# Patient Record
Sex: MALE | Race: WHITE | Employment: UNEMPLOYED | URBAN - METROPOLITAN AREA
[De-identification: names, ages, dates, MRNs, and addresses within clinical notes are randomized per-mention and may not be internally consistent; named-entity substitution may affect disease eponyms.]

---

## 2019-10-28 ENCOUNTER — HOSPITAL ENCOUNTER (OUTPATIENT)
Age: 49
Discharge: HOME | End: 2019-10-28
Payer: COMMERCIAL

## 2019-10-28 ENCOUNTER — HOSPITAL ENCOUNTER (OUTPATIENT)
Age: 49
Discharge: STILL ADMITTED OR ADMINISTRATIVE | End: 2019-10-28
Payer: COMMERCIAL

## 2019-10-28 VITALS
OXYGEN SATURATION: 98 % | TEMPERATURE: 99 F | HEIGHT: 71 IN | RESPIRATION RATE: 20 BRPM | BODY MASS INDEX: 33.88 KG/M2 | HEART RATE: 76 BPM | DIASTOLIC BLOOD PRESSURE: 80 MMHG | WEIGHT: 242 LBS | SYSTOLIC BLOOD PRESSURE: 143 MMHG

## 2019-10-28 DIAGNOSIS — S61.011A LACERATION OF RIGHT THUMB WITHOUT FOREIGN BODY WITHOUT DAMAGE TO NAIL, INITIAL ENCOUNTER: Primary | ICD-10-CM

## 2019-10-28 PROCEDURE — 12001 RPR S/N/AX/GEN/TRNK 2.5CM/<: CPT

## 2019-10-28 PROCEDURE — 99202 OFFICE O/P NEW SF 15 MIN: CPT

## 2019-10-28 PROCEDURE — 99203 OFFICE O/P NEW LOW 30 MIN: CPT

## 2019-10-28 NOTE — ED PROVIDER NOTES
Patient presents with:  Laceration Abrasion (integumentary)      HPI:     Ivana James is a 54year old male with no significant past medical history presents with a chief complaint of right thumb laceration.   Patient states he was installing out above his 1. Laceration of right thumb without foreign body without damage to nail, initial encounter S61.011A        All results reviewed and discussed with patient. See AVS for detailed discharge instructions for your condition today.     [facility]

## 2019-10-28 NOTE — ED INITIAL ASSESSMENT (HPI)
Patient with right thumb laceration. States he was installing a pereyra above his stove when he cut his right thumb on the sheet metal.  Patient states his tdap was updated within the last 10 years.

## 2019-10-29 RX ORDER — CEPHALEXIN 500 MG/1
500 CAPSULE ORAL 2 TIMES DAILY
Qty: 14 CAPSULE | Refills: 0 | Status: SHIPPED | OUTPATIENT
Start: 2019-10-29 | End: 2019-11-05

## 2019-10-29 NOTE — PROGRESS NOTES
Pt called and stated that he forgot to mention that he has had multiple infections post laceration. He states the wound looks very good today. No drainage, erythema or warmth.  He is requesting Keflex which I will call in but told him to hold the antibiotic

## 2019-11-04 ENCOUNTER — WALK IN (OUTPATIENT)
Dept: URGENT CARE | Age: 49
End: 2019-11-04

## 2019-11-04 VITALS — HEART RATE: 68 BPM | RESPIRATION RATE: 18 BRPM | TEMPERATURE: 97.8 F

## 2019-11-04 DIAGNOSIS — Z48.02 VISIT FOR SUTURE REMOVAL: Primary | ICD-10-CM

## 2019-11-04 PROCEDURE — 99202 OFFICE O/P NEW SF 15 MIN: CPT | Performed by: NURSE PRACTITIONER

## 2019-11-04 RX ORDER — BUSPIRONE HYDROCHLORIDE 15 MG/1
TABLET ORAL
Refills: 2 | COMMUNITY
Start: 2019-10-01

## 2019-11-04 ASSESSMENT — ENCOUNTER SYMPTOMS
RESPIRATORY NEGATIVE: 1
NEUROLOGICAL NEGATIVE: 1
CONSTITUTIONAL NEGATIVE: 1
WOUND: 1

## 2019-11-22 ENCOUNTER — LAB ENCOUNTER (OUTPATIENT)
Dept: LAB | Age: 49
End: 2019-11-22
Attending: FAMILY MEDICINE
Payer: COMMERCIAL

## 2019-11-22 DIAGNOSIS — K92.1 BLOOD IN THE STOOL: Primary | ICD-10-CM

## 2019-11-22 PROCEDURE — 36415 COLL VENOUS BLD VENIPUNCTURE: CPT

## 2019-11-22 PROCEDURE — 85025 COMPLETE CBC W/AUTO DIFF WBC: CPT

## 2021-01-04 ENCOUNTER — HOSPITAL ENCOUNTER (OUTPATIENT)
Age: 51
Discharge: HOME | End: 2021-01-04
Payer: COMMERCIAL

## 2021-01-04 ENCOUNTER — APPOINTMENT (OUTPATIENT)
Dept: GENERAL RADIOLOGY | Age: 51
End: 2021-01-04
Attending: PHYSICIAN ASSISTANT
Payer: COMMERCIAL

## 2021-01-04 VITALS
OXYGEN SATURATION: 97 % | HEIGHT: 71 IN | HEART RATE: 87 BPM | RESPIRATION RATE: 18 BRPM | WEIGHT: 240 LBS | DIASTOLIC BLOOD PRESSURE: 90 MMHG | SYSTOLIC BLOOD PRESSURE: 144 MMHG | BODY MASS INDEX: 33.6 KG/M2 | TEMPERATURE: 98 F

## 2021-01-04 DIAGNOSIS — S82.891A CLOSED FRACTURE OF RIGHT ANKLE, INITIAL ENCOUNTER: Primary | ICD-10-CM

## 2021-01-04 PROCEDURE — 73630 X-RAY EXAM OF FOOT: CPT | Performed by: PHYSICIAN ASSISTANT

## 2021-01-04 PROCEDURE — 99213 OFFICE O/P EST LOW 20 MIN: CPT

## 2021-01-04 PROCEDURE — 73610 X-RAY EXAM OF ANKLE: CPT | Performed by: PHYSICIAN ASSISTANT

## 2021-01-04 PROCEDURE — 99214 OFFICE O/P EST MOD 30 MIN: CPT

## 2021-01-04 PROCEDURE — 29515 APPLICATION SHORT LEG SPLINT: CPT

## 2021-01-04 RX ORDER — HYDROCODONE BITARTRATE AND ACETAMINOPHEN 5; 325 MG/1; MG/1
1-2 TABLET ORAL EVERY 6 HOURS PRN
Qty: 12 TABLET | Refills: 0 | Status: SHIPPED | OUTPATIENT
Start: 2021-01-04 | End: 2021-01-11

## 2021-01-04 RX ORDER — BUSPIRONE HYDROCHLORIDE 15 MG/1
TABLET ORAL
COMMUNITY
Start: 2019-10-01

## 2021-01-04 NOTE — ED PROVIDER NOTES
Patient Seen in: [facility]      History   Patient presents with:  Leg or Foot Injury    Stated Complaint: Right ankle injury, slipped on the ice, swollen, red, painful    HPI/Subjective:   HPI    53 yo male here for evaluation of R lower ext Abdominal:      General: Abdomen is flat. Musculoskeletal: Normal range of motion. Right ankle: He exhibits swelling and ecchymosis. Tenderness. Medial malleolus tenderness found.       Comments: 2+ dp pulse, brisk cap refill       Skin:     Genera

## 2024-03-19 PROBLEM — Z00.00 ENCOUNTER FOR WELLNESS EXAMINATION IN ADULT: Status: ACTIVE | Noted: 2024-03-19

## 2024-03-19 NOTE — PROGRESS NOTES
Subjective:   Jaret Arana is a 53 year old male who presents for Routine Physical (Lab orders)   Patient is a pleasant 53-year-old male with past medical history consistent for anxiety.  Patient presents to office today for physical and lab work.  Patient states his health pretty good. Hx of right ankle fracture, has stiffness at times.     Diet: Not the greatest, feels its getting better, too many carbs, wants more vegetables. 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   Exercise: workouts with  when not on road. 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 Does lots of weight training. Tries to go early and do treadmill.   Sleep: Sleep better since pandemic. Has hx of DREAD, has machine he does not use. He has lost weight and sleeping has become better.   Stress: Years ago stressed with [employer]. Now with goo goo dolls less stress. Would decompress to manage stress at home.   Social: Lives alone. Dating. Own a home in [place].   Sexually Active: No and yes soon. New relationship.   Prophylaxis: Condoms.  Alcohol: Drink beer on occasion, over it.   Tobacco: No never.   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   Vaccinations: shingles today   Colon: Colonoscopy 2022 good for 10 years.     Does take take testosterone from [employer]. Reports 300 baseline. 150 mg cypionate intermittently. Last injected today.   Denies use of estrogen blocker.  Reports intermittent mood swings.  Does not have issues with acne. Reports last level on TRT 1000.            History reviewed. No pertinent past medical history.   History reviewed. No pertinent surgical history.     History/Other:    Chief Complaint Reviewed and Verified  Nursing Notes Reviewed and   Verified  Tobacco Reviewed  Allergies Reviewed  Medications Reviewed    Problem List Reviewed  Medical History Reviewed  Surgical History   Reviewed  Family History Reviewed  Social History Reviewed         Tobacco:  He has never smoked  tobacco.    No current outpatient medications on file.         Review of Systems:  Review of Systems   Constitutional: Negative.  Negative for activity change, chills and fever.   HENT: Negative.  Negative for congestion, ear pain, postnasal drip, sinus pain, sore throat and trouble swallowing.    Respiratory: Negative.  Negative for cough, shortness of breath and wheezing.    Cardiovascular: Negative.  Negative for chest pain and leg swelling.   Gastrointestinal: Negative.  Negative for abdominal pain, blood in stool, constipation and diarrhea.   Endocrine: Negative.    Genitourinary: Negative.  Negative for difficulty urinating, dysuria and flank pain.        Low libido   Musculoskeletal: Negative.  Negative for arthralgias, back pain and neck stiffness.   Skin: Negative.  Negative for color change and rash.   Neurological: Negative.  Negative for dizziness and headaches.   Hematological:  Negative for adenopathy.         Objective:   /84 (BP Location: Right arm, Patient Position: Sitting, Cuff Size: adult)   Pulse 63   Ht 5' 11\" (1.803 m)   Wt 228 lb (103.4 kg)   BMI 31.80 kg/m²  Estimated body mass index is 31.8 kg/m² as calculated from the following:    Height as of this encounter: 5' 11\" (1.803 m).    Weight as of this encounter: 228 lb (103.4 kg).  Physical Exam  Vitals and nursing note reviewed.   Constitutional:       Appearance: Normal appearance. He is normal weight.   HENT:      Head: Normocephalic.      Right Ear: Tympanic membrane, ear canal and external ear normal.      Left Ear: Tympanic membrane, ear canal and external ear normal.      Nose: Nose normal.      Comments: Narrow oropharynx     Mouth/Throat:      Mouth: Mucous membranes are moist.   Eyes:      Extraocular Movements: Extraocular movements intact.      Pupils: Pupils are equal, round, and reactive to light.   Cardiovascular:      Rate and Rhythm: Normal rate and regular rhythm.      Pulses: Normal pulses.      Heart sounds:  Normal heart sounds. No murmur heard.  Pulmonary:      Effort: Pulmonary effort is normal. No respiratory distress.      Breath sounds: Normal breath sounds. No wheezing.   Abdominal:      General: There is no distension.      Palpations: Abdomen is soft.      Tenderness: There is no abdominal tenderness.   Musculoskeletal:         General: Normal range of motion.      Cervical back: Normal range of motion and neck supple.      Right lower leg: No edema.      Left lower leg: No edema.   Lymphadenopathy:      Cervical: No cervical adenopathy.   Skin:     General: Skin is warm and dry.      Capillary Refill: Capillary refill takes less than 2 seconds.      Findings: No rash.   Neurological:      General: No focal deficit present.      Mental Status: He is alert and oriented to person, place, and time.   Psychiatric:         Mood and Affect: Mood normal.         Behavior: Behavior normal.           Assessment & Plan:   1. Encounter for wellness examination in adult (Primary)  Assessment & Plan:  Wellness labs ordered.  Encouraged increasing physical activity which includes raising heart rate 3 to 5 days/week for at least 30 minutes at a time, while also performing mild strength exercises.  Patient counseled on importance of dietary modifications, which may include limiting fats, red meat, and carbohydrates, while increasing fruit and vegetable intake, and trying to adhere to a low sodium/Mediterranean diet.  Encouraged to manage stress.  Encouraged good sleep habits.  Encouraged good sexual health.    Colonoscopy in 2022. Follow up 10 years    Patient aware of plan of care. All questions answered to satisfaction of the patient. Patient instructed to call office or reach out via [employer] if any issues arise. For urgent issues and/or reviewed red flags please proceed to the urgent care or ER.  Also, inform the nurse practitioner with any new symptoms or medication side effects.      Orders:  -     Hemoglobin A1C; Future;  Expected date: 03/21/2024  -     CBC With Differential With Platelet; Future; Expected date: 03/21/2024  -     Comp Metabolic Panel (14); Future; Expected date: 03/21/2024  -     Cancel: COLOGUARD COLON CANCER SCREENING (EXTERNAL)  -     Lipid Panel; Future; Expected date: 03/21/2024  -     PSA Total, Screen; Future; Expected date: 03/21/2024  -     TSH W Reflex To Free T4; Future; Expected date: 03/21/2024  -     Vitamin D; Future; Expected date: 03/21/2024  2. Need for shingles vaccine  Assessment & Plan:  In office today, follow-up 2 months for second  Orders:  -     ZOSTER VACC RECOMBINANT IM NJX  3. Long-term current use of testosterone replacement therapy  Assessment & Plan:  Patient currently on 150 mg testosterone cypionate weekly IM.  Check Vitamin D  Check CBC, check CMP, check lipids, check EKG r/o LVH.   Check hormones when on washout.      Orders:  -     Vitamin D; Future; Expected date: 03/21/2024  -     EKG 12 Lead; Future; Expected date: 03/21/2024  -     Testosterone, Total And Free; Future; Expected date: 03/21/2024  -     Estrogens Fractionated, Serum; Future; Expected date: 03/21/2024  -     Progesterone; Future; Expected date: 03/21/2024  -     Sex Hormone Binding Globulin, Serum; Future; Expected date: 03/21/2024  4. Sleep apnea, obstructive  Assessment & Plan:  Patient reports history of sleep apnea  Has CPAP, does not use.  Patient reports issues have lessened and resolved since he has lost weight.  Educated on bite blocks and following up with dentist for alternative options.  5. Leg cramping  Assessment & Plan:  Patient reports intermittent leg cramping.  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  Counseled on use of LYRIC hose  Patient using good support shoes on clouds          Return if symptoms worsen or fail to improve.    Redd Ly, JANETT, 3/19/2024, 3:55 PM

## 2024-03-21 ENCOUNTER — OFFICE VISIT (OUTPATIENT)
Dept: FAMILY MEDICINE CLINIC | Facility: CLINIC | Age: 54
End: 2024-03-21
Payer: COMMERCIAL

## 2024-03-21 VITALS
DIASTOLIC BLOOD PRESSURE: 84 MMHG | SYSTOLIC BLOOD PRESSURE: 137 MMHG | BODY MASS INDEX: 31.92 KG/M2 | WEIGHT: 228 LBS | HEIGHT: 71 IN | HEART RATE: 63 BPM

## 2024-03-21 DIAGNOSIS — Z23 NEED FOR SHINGLES VACCINE: ICD-10-CM

## 2024-03-21 DIAGNOSIS — Z00.00 ENCOUNTER FOR WELLNESS EXAMINATION IN ADULT: Primary | ICD-10-CM

## 2024-03-21 DIAGNOSIS — R25.2 LEG CRAMPING: ICD-10-CM

## 2024-03-21 DIAGNOSIS — G47.33 SLEEP APNEA, OBSTRUCTIVE: ICD-10-CM

## 2024-03-21 DIAGNOSIS — Z79.890 LONG-TERM CURRENT USE OF TESTOSTERONE REPLACEMENT THERAPY: ICD-10-CM

## 2024-03-21 PROBLEM — Z12.11 COLON CANCER SCREENING: Status: ACTIVE | Noted: 2024-03-19

## 2024-03-21 PROCEDURE — 3079F DIAST BP 80-89 MM HG: CPT

## 2024-03-21 PROCEDURE — 99386 PREV VISIT NEW AGE 40-64: CPT

## 2024-03-21 PROCEDURE — 90750 HZV VACC RECOMBINANT IM: CPT

## 2024-03-21 PROCEDURE — 3075F SYST BP GE 130 - 139MM HG: CPT

## 2024-03-21 PROCEDURE — 90471 IMMUNIZATION ADMIN: CPT

## 2024-03-21 PROCEDURE — 3008F BODY MASS INDEX DOCD: CPT

## 2024-03-21 PROCEDURE — 99213 OFFICE O/P EST LOW 20 MIN: CPT

## 2024-03-21 NOTE — ASSESSMENT & PLAN NOTE
Patient reports history of sleep apnea  Has CPAP, does not use.  Patient reports issues have lessened and resolved since he has lost weight.  Educated on bite blocks and following up with dentist for alternative options.

## 2024-03-21 NOTE — ASSESSMENT & PLAN NOTE
Patient currently on 150 mg testosterone cypionate weekly IM.  Check Vitamin D  Check CBC, check CMP, check lipids, check EKG r/o LVH.   Check hormones when on washout.

## 2024-03-21 NOTE — ASSESSMENT & PLAN NOTE
Wellness labs ordered.  Encouraged increasing physical activity which includes raising heart rate 3 to 5 days/week for at least 30 minutes at a time, while also performing mild strength exercises.  Patient counseled on importance of dietary modifications, which may include limiting fats, red meat, and carbohydrates, while increasing fruit and vegetable intake, and trying to adhere to a low sodium/Mediterranean diet.  Encouraged to manage stress.  Encouraged good sleep habits.  Encouraged good sexual health.    Colonoscopy in 2022. Follow up 10 years    Patient aware of plan of care. All questions answered to satisfaction of the patient. Patient instructed to call office or reach out via [employer] if any issues arise. For urgent issues and/or reviewed red flags please proceed to the urgent care or ER.  Also, inform the nurse practitioner with any new symptoms or medication side effects.

## 2024-03-21 NOTE — ASSESSMENT & PLAN NOTE
Patient reports intermittent leg cramping.  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  Counseled on use of LYRIC hose  Patient using good support shoes on clouds

## 2024-03-27 ENCOUNTER — EKG ENCOUNTER (OUTPATIENT)
Dept: LAB | Age: 54
End: 2024-03-27
Payer: COMMERCIAL

## 2024-03-27 ENCOUNTER — LAB ENCOUNTER (OUTPATIENT)
Dept: LAB | Age: 54
End: 2024-03-27
Payer: COMMERCIAL

## 2024-03-27 DIAGNOSIS — Z79.890 LONG-TERM CURRENT USE OF TESTOSTERONE REPLACEMENT THERAPY: ICD-10-CM

## 2024-03-27 DIAGNOSIS — Z00.00 ENCOUNTER FOR WELLNESS EXAMINATION IN ADULT: ICD-10-CM

## 2024-03-27 LAB
ALBUMIN SERPL-MCNC: 4.5 G/DL (ref 3.2–4.8)
ALBUMIN/GLOB SERPL: 1.7 {RATIO} (ref 1–2)
ALP LIVER SERPL-CCNC: 62 U/L
ALT SERPL-CCNC: 21 U/L
ANION GAP SERPL CALC-SCNC: 4 MMOL/L (ref 0–18)
AST SERPL-CCNC: 26 U/L (ref ?–34)
ATRIAL RATE: 62 BPM
BASOPHILS # BLD AUTO: 0.06 X10(3) UL (ref 0–0.2)
BASOPHILS NFR BLD AUTO: 0.7 %
BILIRUB SERPL-MCNC: 0.8 MG/DL (ref 0.3–1.2)
BUN BLD-MCNC: 9 MG/DL (ref 9–23)
BUN/CREAT SERPL: 8.6 (ref 10–20)
CALCIUM BLD-MCNC: 9.7 MG/DL (ref 8.7–10.4)
CHLORIDE SERPL-SCNC: 109 MMOL/L (ref 98–112)
CHOLEST SERPL-MCNC: 162 MG/DL (ref ?–200)
CO2 SERPL-SCNC: 30 MMOL/L (ref 21–32)
COMPLEXED PSA SERPL-MCNC: 1.1 NG/ML (ref ?–4)
CREAT BLD-MCNC: 1.05 MG/DL
DEPRECATED RDW RBC AUTO: 43.8 FL (ref 35.1–46.3)
EGFRCR SERPLBLD CKD-EPI 2021: 86 ML/MIN/1.73M2 (ref 60–?)
EOSINOPHIL # BLD AUTO: 0.17 X10(3) UL (ref 0–0.7)
EOSINOPHIL NFR BLD AUTO: 2 %
ERYTHROCYTE [DISTWIDTH] IN BLOOD BY AUTOMATED COUNT: 12.9 % (ref 11–15)
EST. AVERAGE GLUCOSE BLD GHB EST-MCNC: 91 MG/DL (ref 68–126)
FASTING PATIENT LIPID ANSWER: YES
FASTING STATUS PATIENT QL REPORTED: YES
GLOBULIN PLAS-MCNC: 2.6 G/DL (ref 2.8–4.4)
GLUCOSE BLD-MCNC: 96 MG/DL (ref 70–99)
HBA1C MFR BLD: 4.8 % (ref ?–5.7)
HCT VFR BLD AUTO: 52.2 %
HDLC SERPL-MCNC: 41 MG/DL (ref 40–59)
HGB BLD-MCNC: 17.2 G/DL
IMM GRANULOCYTES # BLD AUTO: 0.02 X10(3) UL (ref 0–1)
IMM GRANULOCYTES NFR BLD: 0.2 %
LDLC SERPL CALC-MCNC: 104 MG/DL (ref ?–100)
LYMPHOCYTES # BLD AUTO: 3.53 X10(3) UL (ref 1–4)
LYMPHOCYTES NFR BLD AUTO: 42.4 %
MCH RBC QN AUTO: 30.5 PG (ref 26–34)
MCHC RBC AUTO-ENTMCNC: 33 G/DL (ref 31–37)
MCV RBC AUTO: 92.6 FL
MONOCYTES # BLD AUTO: 0.39 X10(3) UL (ref 0.1–1)
MONOCYTES NFR BLD AUTO: 4.7 %
NEUTROPHILS # BLD AUTO: 4.15 X10 (3) UL (ref 1.5–7.7)
NEUTROPHILS # BLD AUTO: 4.15 X10(3) UL (ref 1.5–7.7)
NEUTROPHILS NFR BLD AUTO: 50 %
NONHDLC SERPL-MCNC: 121 MG/DL (ref ?–130)
OSMOLALITY SERPL CALC.SUM OF ELEC: 295 MOSM/KG (ref 275–295)
P AXIS: 44 DEGREES
P-R INTERVAL: 162 MS
PLATELET # BLD AUTO: 246 10(3)UL (ref 150–450)
POTASSIUM SERPL-SCNC: 4.6 MMOL/L (ref 3.5–5.1)
PROGEST SERPL-MCNC: 0.4 NG/ML
PROT SERPL-MCNC: 7.1 G/DL (ref 5.7–8.2)
Q-T INTERVAL: 396 MS
QRS DURATION: 110 MS
QTC CALCULATION (BEZET): 401 MS
R AXIS: 30 DEGREES
RBC # BLD AUTO: 5.64 X10(6)UL
SODIUM SERPL-SCNC: 143 MMOL/L (ref 136–145)
T AXIS: 45 DEGREES
TRIGL SERPL-MCNC: 91 MG/DL (ref 30–149)
TSI SER-ACNC: 1.92 MIU/ML (ref 0.55–4.78)
VENTRICULAR RATE: 62 BPM
VIT D+METAB SERPL-MCNC: 49.8 NG/ML (ref 30–100)
VLDLC SERPL CALC-MCNC: 15 MG/DL (ref 0–30)
WBC # BLD AUTO: 8.3 X10(3) UL (ref 4–11)

## 2024-03-27 PROCEDURE — 93010 ELECTROCARDIOGRAM REPORT: CPT | Performed by: STUDENT IN AN ORGANIZED HEALTH CARE EDUCATION/TRAINING PROGRAM

## 2024-03-27 PROCEDURE — 84144 ASSAY OF PROGESTERONE: CPT

## 2024-03-27 PROCEDURE — 36415 COLL VENOUS BLD VENIPUNCTURE: CPT

## 2024-03-27 PROCEDURE — 83036 HEMOGLOBIN GLYCOSYLATED A1C: CPT

## 2024-03-27 PROCEDURE — 93005 ELECTROCARDIOGRAM TRACING: CPT

## 2024-03-27 PROCEDURE — 82671 ASSAY OF ESTROGENS: CPT

## 2024-03-27 PROCEDURE — 82306 VITAMIN D 25 HYDROXY: CPT

## 2024-03-27 PROCEDURE — 80061 LIPID PANEL: CPT

## 2024-03-27 PROCEDURE — 84403 ASSAY OF TOTAL TESTOSTERONE: CPT

## 2024-03-27 PROCEDURE — 84443 ASSAY THYROID STIM HORMONE: CPT

## 2024-03-27 PROCEDURE — 80053 COMPREHEN METABOLIC PANEL: CPT

## 2024-03-27 PROCEDURE — 85025 COMPLETE CBC W/AUTO DIFF WBC: CPT

## 2024-03-27 PROCEDURE — 84402 ASSAY OF FREE TESTOSTERONE: CPT

## 2024-03-30 LAB
FREE TESTOST DIRECT: 20.1 PG/ML
TESTOSTERONE: 883 NG/DL

## 2024-04-04 LAB
ESTRADIOL: 44.7 PG/ML
ESTRONE: 73 PG/ML

## 2025-04-23 NOTE — PROGRESS NOTES
Subjective:   Jaret Arana is a 54 year old male who presents for Physical (Lab order,)   Patient is a pleasant 54-year-old male with past medical history consistent for anxiety and TRT use.  Patient presents to office today for physical and lab work.  Patient states his health pretty good. Hx of right ankle fracture, has stiffness at times getting better.      Diet: Not the greatest, feels its getting better, too many carbs, wants more vegetables. 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 Has made some changes in regards to portion control. Not eating before bed.   Exercise: workouts with  when not on road. 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 Does lots of weight training. Tries to go early and do treadmill.   Sleep: Sleep better since pandemic. Has hx of DREAD, has machine he does not use. He has lost weight and sleeping has become better.   Stress: Years ago stressed with [employer]. Now with goo goo dolls less stress. Would decompress to manage stress at home.   Social: Lives alone. Dating. Own a home in [place].   Sexually Active: yes   Prophylaxis: Partner on OCPs  Alcohol: Drink beer on occasion, over it.   Tobacco: No never.   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   Vaccinations: prevnar 20 and tetanus in office.   Colon: Colonoscopy 2022 good for 10 years.      Does take take testosterone from [facility]. Reports 300 baseline. 150 mg cypionate once weekly. Last injected Monday  Denies use of estrogen blocker.  Reports intermittent mood swings.  Does not have issues with acne. Reports last level on TRT 1000.  Does donate twice annually.       Has outside labs from [facility] in [place].  Glucose normal at 91.  CMP within normal limits aside from ALT slightly elevated at 45 AST 38, bilirubin 1.  Cholesterol total 188, triglycerides 131, HDL 46, ,.  A1c 5.1.  Testosterone reported 490.  Estradiol 21.5.  PSA within normal limits.  Mildly polycythemic with  hemoglobin of 17.1 no platelets.         Past Medical History[1]   Past Surgical History[2]     History/Other:    Chief Complaint Reviewed and Verified  Nursing Notes Reviewed and   Verified  Tobacco Reviewed  Allergies Reviewed  Medications Reviewed    Problem List Reviewed  Medical History Reviewed  Surgical History   Reviewed  Family History Reviewed  Social History Reviewed         Tobacco:  He has never smoked tobacco.    Current Medications[3]      Review of Systems:  Review of Systems   Constitutional: Negative.  Negative for activity change, chills and fever.   HENT: Negative.  Negative for congestion, ear pain, postnasal drip, sinus pain, sore throat and trouble swallowing.    Respiratory: Negative.  Negative for cough, shortness of breath and wheezing.    Cardiovascular: Negative.  Negative for chest pain and leg swelling.   Gastrointestinal: Negative.  Negative for abdominal pain, blood in stool, constipation, diarrhea, nausea and vomiting.   Endocrine: Negative.    Genitourinary: Negative.  Negative for difficulty urinating, dysuria and flank pain.   Musculoskeletal: Negative.  Negative for arthralgias, back pain and neck stiffness.   Skin: Negative.  Negative for color change and rash.   Neurological: Negative.  Negative for dizziness and headaches.   Hematological:  Negative for adenopathy.         Objective:   /90 (BP Location: Right arm, Patient Position: Sitting, Cuff Size: large)   Pulse 69   Ht 5' 11\" (1.803 m)   Wt 237 lb 3.2 oz (107.6 kg)   SpO2 96%   BMI 33.08 kg/m²  Estimated body mass index is 33.08 kg/m² as calculated from the following:    Height as of this encounter: 5' 11\" (1.803 m).    Weight as of this encounter: 237 lb 3.2 oz (107.6 kg).  Physical Exam  Vitals and nursing note reviewed.   Constitutional:       Appearance: Normal appearance. He is normal weight.   HENT:      Head: Normocephalic.      Right Ear: Tympanic membrane, ear canal and external ear normal.  There is no impacted cerumen.      Left Ear: Tympanic membrane, ear canal and external ear normal. There is no impacted cerumen.      Nose: Nose normal. No congestion or rhinorrhea.      Mouth/Throat:      Mouth: Mucous membranes are moist.      Pharynx: No oropharyngeal exudate or posterior oropharyngeal erythema.   Eyes:      Extraocular Movements: Extraocular movements intact.      Pupils: Pupils are equal, round, and reactive to light.   Cardiovascular:      Rate and Rhythm: Normal rate and regular rhythm.      Pulses: Normal pulses.      Heart sounds: Normal heart sounds. No murmur heard.  Pulmonary:      Effort: Pulmonary effort is normal. No respiratory distress.      Breath sounds: Normal breath sounds. No wheezing.   Abdominal:      General: There is no distension.      Palpations: Abdomen is soft.      Tenderness: There is no abdominal tenderness.   Musculoskeletal:         General: Normal range of motion.      Cervical back: Normal range of motion and neck supple.      Right lower leg: No edema.      Left lower leg: No edema.   Lymphadenopathy:      Cervical: No cervical adenopathy.   Skin:     General: Skin is warm and dry.      Capillary Refill: Capillary refill takes less than 2 seconds.      Findings: No rash.   Neurological:      General: No focal deficit present.      Mental Status: He is alert and oriented to person, place, and time.   Psychiatric:         Mood and Affect: Mood normal.         Behavior: Behavior normal.           Assessment & Plan:   1. Encounter for wellness examination in adult (Primary)  -     EKG 12 Lead; Future; Expected date: 04/24/2025  -     Vitamin D; Future; Expected date: 04/24/2025  -     Hemoglobin A1C; Future; Expected date: 04/24/2025  -     CBC With Differential With Platelet; Future; Expected date: 04/24/2025  -     TSH W Reflex To Free T4; Future; Expected date: 04/24/2025  2. Sleep apnea, obstructive  -     EKG 12 Lead; Future; Expected date: 04/24/2025  3.  Long-term current use of testosterone replacement therapy  -     EKG 12 Lead; Future; Expected date: 04/24/2025  -     Vitamin D; Future; Expected date: 04/24/2025  4. Need for tetanus booster  -     TETANUS, DIPHTHERIA TOXOIDS AND ACELLULAR PERTUSIS VACCINE (TDAP), >7 YEARS, IM USE  5. Need for pneumococcal 20-valent conjugate vaccination  -     Prevnar 20 (PCV20) [36896]  6. Elevated blood pressure reading    1. Encounter for wellness examination in adult  Wellness labs ordered.  Encouraged increasing physical activity which includes raising heart rate 3 to 5 days/week for at least 30 minutes at a time, while also performing mild strength exercises.  Patient counseled on importance of dietary modifications, which may include limiting fats, red meat, and carbohydrates, while increasing fruit and vegetable intake, and trying to adhere to a low sodium/Mediterranean diet.  Encouraged to manage stress.  Encouraged good sleep habits.  Encouraged good sexual health.    - EKG 12 Lead; Future  - Vitamin D [E]; Future  - Hemoglobin A1C; Future  - CBC With Differential With Platelet; Future  - TSH W Reflex To Free T4; Future    2. Sleep apnea, obstructive  Off cpap  Check EKG  Denies apnea and daytime fatigue  - EKG 12 Lead; Future    3. Long-term current use of testosterone replacement therapy  Stable at [facility]  Check vitamin D  Check EKG  Check CBC with elevated BP  - EKG 12 Lead; Future  - Vitamin D [E]; Future    4. Need for tetanus booster  Tetanus in office today  - TETANUS, DIPHTHERIA TOXOIDS AND ACELLULAR PERTUSIS VACCINE (TDAP), >7 YEARS, IM USE    5. Need for pneumococcal 20-valent conjugate vaccination  Prevnar 20 in office today   - Prevnar 20 (PCV20) [83556]    6. Elevated blood pressure reading  BP eleavted in office.  Denies CP, SOB, HA, dizziness.   Likely 2/2 polycythemia.   Check CBC  Follow up 6 months     Patient aware of plan of care. All questions answered to satisfaction of the patient. Patient  instructed to call office or reach out via [employer] if any issues arise. For urgent issues and/or reviewed red flags please proceed to the urgent care or ER.  Also, inform the nurse practitioner with any new symptoms or medication side effects.            Return in about 6 months (around 10/24/2025), or if symptoms worsen or fail to improve.    JANETT Irby, 4/23/2025, 11:49 AM          [1] History reviewed. No pertinent past medical history.  [2] History reviewed. No pertinent surgical history.  [3]   Current Outpatient Medications   Medication Sig Dispense Refill    TESTOSTERONE IM Inject 1.75 mL into the muscle.

## 2025-04-24 ENCOUNTER — OFFICE VISIT (OUTPATIENT)
Dept: FAMILY MEDICINE CLINIC | Facility: CLINIC | Age: 55
End: 2025-04-24
Payer: COMMERCIAL

## 2025-04-24 ENCOUNTER — EKG ENCOUNTER (OUTPATIENT)
Dept: LAB | Age: 55
End: 2025-04-24
Payer: COMMERCIAL

## 2025-04-24 ENCOUNTER — LAB ENCOUNTER (OUTPATIENT)
Dept: LAB | Age: 55
End: 2025-04-24
Payer: COMMERCIAL

## 2025-04-24 VITALS
DIASTOLIC BLOOD PRESSURE: 87 MMHG | HEART RATE: 67 BPM | WEIGHT: 237.19 LBS | OXYGEN SATURATION: 96 % | BODY MASS INDEX: 33.21 KG/M2 | HEIGHT: 71 IN | SYSTOLIC BLOOD PRESSURE: 148 MMHG

## 2025-04-24 DIAGNOSIS — Z00.00 ENCOUNTER FOR WELLNESS EXAMINATION IN ADULT: Primary | ICD-10-CM

## 2025-04-24 DIAGNOSIS — G47.33 SLEEP APNEA, OBSTRUCTIVE: ICD-10-CM

## 2025-04-24 DIAGNOSIS — R03.0 ELEVATED BLOOD PRESSURE READING: ICD-10-CM

## 2025-04-24 DIAGNOSIS — Z23 NEED FOR TETANUS BOOSTER: ICD-10-CM

## 2025-04-24 DIAGNOSIS — Z00.00 ENCOUNTER FOR WELLNESS EXAMINATION IN ADULT: ICD-10-CM

## 2025-04-24 DIAGNOSIS — Z23 NEED FOR PNEUMOCOCCAL 20-VALENT CONJUGATE VACCINATION: ICD-10-CM

## 2025-04-24 DIAGNOSIS — Z79.890 LONG-TERM CURRENT USE OF TESTOSTERONE REPLACEMENT THERAPY: ICD-10-CM

## 2025-04-24 LAB
BASOPHILS # BLD AUTO: 0.08 X10(3) UL (ref 0–0.2)
BASOPHILS NFR BLD AUTO: 0.8 %
DEPRECATED RDW RBC AUTO: 45.7 FL (ref 35.1–46.3)
EOSINOPHIL # BLD AUTO: 0.15 X10(3) UL (ref 0–0.7)
EOSINOPHIL NFR BLD AUTO: 1.4 %
ERYTHROCYTE [DISTWIDTH] IN BLOOD BY AUTOMATED COUNT: 13.5 % (ref 11–15)
EST. AVERAGE GLUCOSE BLD GHB EST-MCNC: 94 MG/DL (ref 68–126)
HBA1C MFR BLD: 4.9 % (ref ?–5.7)
HCT VFR BLD AUTO: 49.3 % (ref 39–53)
HGB BLD-MCNC: 16.6 G/DL (ref 13–17.5)
IMM GRANULOCYTES # BLD AUTO: 0.06 X10(3) UL (ref 0–1)
IMM GRANULOCYTES NFR BLD: 0.6 %
LYMPHOCYTES # BLD AUTO: 4.12 X10(3) UL (ref 1–4)
LYMPHOCYTES NFR BLD AUTO: 39.4 %
MCH RBC QN AUTO: 30.6 PG (ref 26–34)
MCHC RBC AUTO-ENTMCNC: 33.7 G/DL (ref 31–37)
MCV RBC AUTO: 90.8 FL (ref 80–100)
MONOCYTES # BLD AUTO: 0.59 X10(3) UL (ref 0.1–1)
MONOCYTES NFR BLD AUTO: 5.6 %
NEUTROPHILS # BLD AUTO: 5.47 X10 (3) UL (ref 1.5–7.7)
NEUTROPHILS # BLD AUTO: 5.47 X10(3) UL (ref 1.5–7.7)
NEUTROPHILS NFR BLD AUTO: 52.2 %
PLATELET # BLD AUTO: 246 10(3)UL (ref 150–450)
RBC # BLD AUTO: 5.43 X10(6)UL (ref 4.3–5.7)
TSI SER-ACNC: 1.25 UIU/ML (ref 0.55–4.78)
VIT D+METAB SERPL-MCNC: 55.3 NG/ML (ref 30–100)
WBC # BLD AUTO: 10.5 X10(3) UL (ref 4–11)

## 2025-04-24 PROCEDURE — 93010 ELECTROCARDIOGRAM REPORT: CPT | Performed by: STUDENT IN AN ORGANIZED HEALTH CARE EDUCATION/TRAINING PROGRAM

## 2025-04-24 PROCEDURE — 84443 ASSAY THYROID STIM HORMONE: CPT

## 2025-04-24 PROCEDURE — 85025 COMPLETE CBC W/AUTO DIFF WBC: CPT

## 2025-04-24 PROCEDURE — 93005 ELECTROCARDIOGRAM TRACING: CPT

## 2025-04-24 PROCEDURE — 36415 COLL VENOUS BLD VENIPUNCTURE: CPT

## 2025-04-24 PROCEDURE — 82306 VITAMIN D 25 HYDROXY: CPT

## 2025-04-24 PROCEDURE — 83036 HEMOGLOBIN GLYCOSYLATED A1C: CPT

## 2025-04-25 ENCOUNTER — PATIENT MESSAGE (OUTPATIENT)
Dept: FAMILY MEDICINE CLINIC | Facility: CLINIC | Age: 55
End: 2025-04-25

## 2025-04-25 DIAGNOSIS — I10 PRIMARY HYPERTENSION: Primary | ICD-10-CM

## 2025-04-25 LAB
ATRIAL RATE: 67 BPM
P AXIS: 39 DEGREES
P-R INTERVAL: 160 MS
Q-T INTERVAL: 386 MS
QRS DURATION: 94 MS
QTC CALCULATION (BEZET): 407 MS
R AXIS: 25 DEGREES
T AXIS: 45 DEGREES
VENTRICULAR RATE: 67 BPM

## 2025-05-01 NOTE — TELEPHONE ENCOUNTER
Please see patient reported blood pressure.  Recorded under patient reported vital signs flowsheet.  Nurse inquiring for further information on hydration.

## 2025-05-01 NOTE — TELEPHONE ENCOUNTER
Excessive thirst can be a side effect of testosterone replacement therapy with fluid changes and electrolyte shifts. Would advise listening to your body and if this thirsty drinking water, liquid IV, or pedialyte as you have been. If dry mouth is of concern can try hard candies and biotene as well.     Finally, for the blood pressure. The BP remains elevated. Would continue to recommend follow up in one month for recheck and possibly starting medications to control. Since the blood pressure remains elevated after donating would recommend follow up. Thanks

## 2025-05-05 PROBLEM — I10 PRIMARY HYPERTENSION: Status: ACTIVE | Noted: 2025-05-05

## 2025-05-05 RX ORDER — LISINOPRIL 5 MG/1
5 TABLET ORAL DAILY
Qty: 90 TABLET | Refills: 0 | Status: SHIPPED | OUTPATIENT
Start: 2025-05-05

## 2025-05-05 NOTE — TELEPHONE ENCOUNTER
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  Please advise.

## 2025-05-05 NOTE — TELEPHONE ENCOUNTER
1. Primary hypertension  -Start lisinopril 5 mg once daily  -Advised low salt diet. Aim for less than 2 grams daily  -Eat less of canned, frozen, dried, packaged and fast foods.  -Limit caffeine, alcohol.   -No smoking.  -Exercise regularly, at least 30 minutes 3-5 times a week.  -Maintain healthy body weight.   -Avoid stress.  -Follow up one month    - lisinopril 5 MG Oral Tab; Take 1 tablet (5 mg total) by mouth daily.  Dispense: 90 tablet; Refill: 0    JANETT Irby

## 2025-06-02 NOTE — PROGRESS NOTES
Subjective:   Jaret Arana is a 54 year old male who presents for Follow - Up ( Elevated Bp)   Patient is a pleasant 54-year-old male with past medical history consistent for anxiety, new HTN, and TRT use.  Patient presents to office today for blood pressure check. He was seen in office on 4/24/25 for physical and BP was 155/90. He was educated on HTN and Dash diet. He reported continued elevation at home and was started on lisinopril 5 mg once daily on 5/5/25. He follows up in office today and states he had some constipation when he first started taking. He also reports some issues with maintaining and completion with erections. However, he has also recently decreased his TRT dose. He notices BP can fluctuate at home as well. Normal in office today. Has home readings ranging from 150s/90s that have been improving as of late. We discussed proper technique for obtaining BP. He will notify office if BP consistetnly above 140/90. He takes meds at night and checks BP in am.             Past Medical History[1]   Past Surgical History[2]     History/Other:    Chief Complaint Reviewed and Verified  Nursing Notes Reviewed and   Verified  Tobacco Reviewed  Allergies Reviewed  Medications Reviewed    Medical History Reviewed  Surgical History Reviewed  Family History   Reviewed  Social History Reviewed         Tobacco:  He has never smoked tobacco.    Current Medications[3]      Review of Systems:  Review of Systems   Constitutional:  Negative for chills and fever.   Respiratory:  Negative for cough and shortness of breath.    Cardiovascular:  Negative for chest pain.   Gastrointestinal:  Positive for constipation.         Objective:   /70 (BP Location: Right arm, Patient Position: Sitting, Cuff Size: large)   Pulse 61   Ht 5' 11\" (1.803 m)   Wt 236 lb (107 kg)   SpO2 97%   BMI 32.92 kg/m²  Estimated body mass index is 32.92 kg/m² as calculated from the following:    Height as of this encounter: 5' 11\"  (1.803 m).    Weight as of this encounter: 236 lb (107 kg).  Physical Exam  Vitals and nursing note reviewed.   Constitutional:       Appearance: Normal appearance. He is obese.   HENT:      Right Ear: External ear normal.      Left Ear: External ear normal.   Cardiovascular:      Rate and Rhythm: Normal rate and regular rhythm.      Pulses: Normal pulses.      Heart sounds: Normal heart sounds.   Pulmonary:      Effort: Pulmonary effort is normal.      Breath sounds: Normal breath sounds.   Skin:     Capillary Refill: Capillary refill takes less than 2 seconds.   Neurological:      Mental Status: He is alert and oriented to person, place, and time.           Assessment & Plan:   1. Primary hypertension (Primary)  2. Elevated blood pressure reading  -BP WNL in office  -Continue lisinopril 5 mg day  -consider arb if constipation and Ed persists  -Discussed with patient about how to monitor blood pressure at home and continue medication daily as prescribed   -Advised low salt diet. Aim for less than 2 grams daily  -Eat less of canned, frozen, dried, packaged and fast foods.  -Limit caffeine, alcohol.   -No smoking.  -Exercise regularly, at least 30 minutes 3-5 times a week.  -Maintain healthy body weight.   -Avoid stress.      Return in about 6 months (around 12/3/2025).    JANETT Irby, 6/2/2025, 9:18 AM          [1] History reviewed. No pertinent past medical history.  [2] History reviewed. No pertinent surgical history.  [3]   Current Outpatient Medications   Medication Sig Dispense Refill    lisinopril 5 MG Oral Tab Take 1 tablet (5 mg total) by mouth daily. 90 tablet 0    TESTOSTERONE IM Inject 1.75 mL into the muscle.

## 2025-06-03 ENCOUNTER — OFFICE VISIT (OUTPATIENT)
Dept: FAMILY MEDICINE CLINIC | Facility: CLINIC | Age: 55
End: 2025-06-03
Payer: COMMERCIAL

## 2025-06-03 VITALS
DIASTOLIC BLOOD PRESSURE: 70 MMHG | OXYGEN SATURATION: 97 % | HEIGHT: 71 IN | HEART RATE: 61 BPM | BODY MASS INDEX: 33.04 KG/M2 | WEIGHT: 236 LBS | SYSTOLIC BLOOD PRESSURE: 118 MMHG

## 2025-06-03 DIAGNOSIS — I10 PRIMARY HYPERTENSION: Primary | ICD-10-CM

## 2025-06-03 DIAGNOSIS — R03.0 ELEVATED BLOOD PRESSURE READING: ICD-10-CM

## 2025-06-03 PROCEDURE — 3008F BODY MASS INDEX DOCD: CPT

## 2025-06-03 PROCEDURE — 99213 OFFICE O/P EST LOW 20 MIN: CPT

## 2025-06-03 PROCEDURE — 3074F SYST BP LT 130 MM HG: CPT

## 2025-06-03 PROCEDURE — 3078F DIAST BP <80 MM HG: CPT

## 2025-07-28 DIAGNOSIS — I10 PRIMARY HYPERTENSION: ICD-10-CM

## 2025-07-30 RX ORDER — LISINOPRIL 5 MG/1
5 TABLET ORAL DAILY
Qty: 90 TABLET | Refills: 3 | Status: SHIPPED | OUTPATIENT
Start: 2025-07-30